# Patient Record
Sex: MALE | Race: WHITE | NOT HISPANIC OR LATINO | Employment: OTHER | URBAN - METROPOLITAN AREA
[De-identification: names, ages, dates, MRNs, and addresses within clinical notes are randomized per-mention and may not be internally consistent; named-entity substitution may affect disease eponyms.]

---

## 2024-01-18 ENCOUNTER — OFFICE VISIT (OUTPATIENT)
Dept: NEUROLOGY | Facility: CLINIC | Age: 54
End: 2024-01-18
Payer: MEDICARE

## 2024-01-18 VITALS
DIASTOLIC BLOOD PRESSURE: 86 MMHG | SYSTOLIC BLOOD PRESSURE: 124 MMHG | HEIGHT: 72 IN | HEART RATE: 68 BPM | WEIGHT: 120 LBS | BODY MASS INDEX: 16.25 KG/M2

## 2024-01-18 DIAGNOSIS — E46 PROTEIN-CALORIE MALNUTRITION, UNSPECIFIED SEVERITY (MULTI): ICD-10-CM

## 2024-01-18 DIAGNOSIS — G35 SECONDARY PROGRESSIVE MULTIPLE SCLEROSIS (MULTI): ICD-10-CM

## 2024-01-18 DIAGNOSIS — M62.838 MUSCLE SPASM: Primary | ICD-10-CM

## 2024-01-18 PROCEDURE — 99204 OFFICE O/P NEW MOD 45 MIN: CPT | Performed by: PSYCHIATRY & NEUROLOGY

## 2024-01-18 RX ORDER — IBUPROFEN 200 MG
200 TABLET ORAL EVERY 6 HOURS PRN
COMMUNITY

## 2024-01-18 RX ORDER — BACLOFEN 10 MG/1
10 TABLET ORAL 3 TIMES DAILY
Qty: 90 TABLET | Refills: 5 | Status: SHIPPED | OUTPATIENT
Start: 2024-01-18 | End: 2024-02-17

## 2024-01-18 RX ORDER — ACETAMINOPHEN 325 MG/1
325 TABLET ORAL EVERY 6 HOURS PRN
COMMUNITY

## 2024-01-18 NOTE — PROGRESS NOTES
Subjective   Germaine Bliss is a 53 y.o.   male.  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  He was diagnosed with relapsing-remitting multiple sclerosis in 2000.  He has previously been tried and failed Copaxone, Avonex, and Tecfidera.  At his previous visit he had severe bilateral lower extremity weakness, could stand, not walk.  He subsequently applied for and received permanent and total disability.  He lives at home, is homebound, has family members caring for him, including lifting his body to toilet, bathe, and feeding him.  He is in a wheelchair or bed most of the time.  He is not on any prescription medications.  Today, his wife and a son are with him.  His wife works full time, and is requesting [facility] paperwork to restrict her hours working to 40 hours, to allow her time to care for her spouse.  He has had some muscle spasms in the legs.  The patient/family state he has no cognitive deficits, and he swallows well.  He has severe weakness in the lower extremities, less severe weakness in the upper extremities.  During the day he watches television, rests in bed, has some sensitivity to heat, able to converse with his family, has occasional bladder accidents.    Objective   Neurological Exam  Mental Status   Orientation: Month, year, place. Recent and remote memory are intact. Able to name objects and read. Able to interpret the cookie thePortfolium picture.. Attention and concentration are normal. Fund of knowledge is appropriate for level of education.    Cranial Nerves  CN II: Visual fields full to confrontation.  CN III, IV, VI: Extraocular movements intact bilaterally.  CN VII: Full and symmetric facial movement.  CN XII: Tongue midline without atrophy or fasciculations.    Motor  Normal muscle bulk throughout. No fasciculations present. Increased muscle tone. No abnormal involuntary movements.  Upper extremities: right is 2/5, contractures of hands, lower  extremities: trace movements, severe hypertonia..    Sensory  Light touch is normal in upper and lower extremities.     Reflexes                                            Right                      Left  Biceps                                 3+                         3+  Triceps                                3+                         3+  Patellar                                3+                         3+  Achilles                                1+                         1+    Coordination    Unable to test coordination due to severity of weakness in all 4 extremities..    Gait    Unable to stand up or walk due to severe bilateral leg weakness..      Physical Exam  Eyes:      Extraocular Movements: Extraocular movements intact.   Neurological:      Deep Tendon Reflexes:      Reflex Scores:       Tricep reflexes are 3+ on the right side and 3+ on the left side.       Bicep reflexes are 3+ on the right side and 3+ on the left side.       Patellar reflexes are 3+ on the right side and 3+ on the left side.       Achilles reflexes are 1+ on the right side and 1+ on the left side.      I personally reviewed laboratory, radiographic, and medical studies which were pertinent for nothing.    Assessment/Plan
